# Patient Record
Sex: MALE | Race: WHITE | NOT HISPANIC OR LATINO | URBAN - METROPOLITAN AREA
[De-identification: names, ages, dates, MRNs, and addresses within clinical notes are randomized per-mention and may not be internally consistent; named-entity substitution may affect disease eponyms.]

---

## 2019-07-31 ENCOUNTER — EMERGENCY (EMERGENCY)
Facility: HOSPITAL | Age: 29
LOS: 0 days | Discharge: HOME | End: 2019-07-31
Admitting: EMERGENCY MEDICINE
Payer: MEDICAID

## 2019-07-31 VITALS
DIASTOLIC BLOOD PRESSURE: 92 MMHG | HEART RATE: 87 BPM | TEMPERATURE: 98 F | RESPIRATION RATE: 20 BRPM | SYSTOLIC BLOOD PRESSURE: 113 MMHG | OXYGEN SATURATION: 98 %
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
## 2019-07-31 DIAGNOSIS — M25.571 PAIN IN RIGHT ANKLE AND JOINTS OF RIGHT FOOT: ICD-10-CM
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
## 2019-07-31 PROCEDURE — 99283 EMERGENCY DEPT VISIT LOW MDM: CPT

## 2019-07-31 PROCEDURE — 73610 X-RAY EXAM OF ANKLE: CPT | Mod: 26,RT

## 2019-07-31 PROCEDURE — 73630 X-RAY EXAM OF FOOT: CPT | Mod: 26,RT

## 2019-07-31 NOTE — ED PROVIDER NOTE - PHYSICAL EXAMINATION
VITAL SIGNS: I have reviewed nursing notes and confirm.  CONSTITUTIONAL: Well-developed; well-nourished; in no acute distress.  SKIN: Skin exam is warm and dry, no acute rash.  HEAD: Normocephalic; atraumatic.  EYES: PERRL, EOM intact; conjunctiva and sclera clear.  ENT: No nasal discharge; airway clear.   NECK: Supple; non tender.  CARD: S1, S2 normal; no murmurs, gallops, or rubs. Regular rate and rhythm.  RESP: Clear to auscultation bilaterally. No wheezes, rales or rhonchi.  ABD: Normal bowel sounds; soft; non-distended; non-tender.   EXT: (+) Mild tenderness over right ankle and dorsal aspect of right foot. FROM. No swelling, no ecchymosis.  No edema.  LYMPH: No acute cervical adenopathy.  NEURO: Alert, oriented. Grossly unremarkable. No focal deficits.  PSYCH: Cooperative, appropriate.

## 2019-07-31 NOTE — ED PROVIDER NOTE - NS ED ROS FT
Constitutional: See HPI. No fever/chills.  Neck: No neck pain or stiffness.  Cardiac: No chest pain, SOB or edema. No chest pain with exertion.  Respiratory: No cough or respiratory distress. No hemoptysis.   GI: No nausea, vomiting, diarrhea or abdominal pain.  MS: (+) Right ankle pain. (+) right foot pain. No numbness, no tingling, no redness, No myalgia, muscle weakness, joint pain or back pain.  Neuro: No headache or weakness. No LOC.  Skin: No rash.  Except as documented in the HPI, all other systems are negative.

## 2019-07-31 NOTE — ED PROVIDER NOTE - NSFOLLOWUPINSTRUCTIONS_ED_ALL_ED_FT
Ankle Pain  Image   Many things can cause ankle pain, including an injury to the area and overuse of the ankle. The ankle joint holds your body weight and allows you to move around. Ankle pain can occur on either side or the back of one ankle or both ankles. Ankle pain may be sharp and burning or dull and aching. There may be tenderness, stiffness, redness, or warmth around the ankle.    Follow these instructions at home:  Activity     Rest your ankle as told by your health care provider. Avoid any activities that cause ankle pain.  Do exercises as told by your health care provider.  Ask your health care provider if you can drive.  Using a brace, a bandage, or crutches     If you were given a brace:  Wear it as told by your health care provider.  Remove it when you take a bath or a shower.  Try not to move your ankle very much, but wiggle your toes from time to time. This helps to prevent swelling.  If you were given an elastic bandage:  Remove it when you take a bath or a shower.  Try not to move your ankle very much, but wiggle your toes from time to time. This helps to prevent swelling.  Adjust the bandage to make it more comfortable if it feels too tight.  Loosen the bandage if you have numbness or tingling in your foot or if your foot turns cold and blue.  If you have crutches, use them as told by your health care provider. Continue to use them until you can walk without feeling pain in your ankle.  Managing pain, stiffness, and swelling     Raise (elevate) your ankle above the level of your heart while you are sitting or lying down.  If directed, apply ice to the area:  Put ice in a plastic bag.  Place a towel between your skin and the bag.  Leave the ice on for 20 minutes, 2–3 times per day.  General instructions     Keep all follow-up visits as told by your health care provider. This is important.  Record this information that may be helpful for you and your health care provider:  How often you have ankle pain.  Where the pain is located.  What the pain feels like.  Take over-the-counter and prescription medicines only as told by your health care provider.  Contact a health care provider if:  Your pain gets worse.  Your pain is not relieved with medicines.  You have a fever or chills.  You are having more trouble with walking.  You have new symptoms.  Get help right away if:  Your foot, leg, toes, or ankle tingles or becomes numb.  Your foot, leg, toes, or ankle becomes swollen.  Your foot, leg, toes, or ankle turns pale or blue.  This information is not intended to replace advice given to you by your health care provider. Make sure you discuss any questions you have with your health care provider. Ankle Pain    Many things can cause ankle pain, including an injury to the area and overuse of the ankle. The ankle joint holds your body weight and allows you to move around. Ankle pain can occur on either side or the back of one ankle or both ankles. Ankle pain may be sharp and burning or dull and aching. There may be tenderness, stiffness, redness, or warmth around the ankle.    Follow these instructions at home:  Activity     Rest your ankle as told by your health care provider. Avoid any activities that cause ankle pain.  Do exercises as told by your health care provider.  Ask your health care provider if you can drive.  Using a brace, a bandage, or crutches     If you were given a brace:  Wear it as told by your health care provider.  Remove it when you take a bath or a shower.  Try not to move your ankle very much, but wiggle your toes from time to time. This helps to prevent swelling.  If you were given an elastic bandage:  Remove it when you take a bath or a shower.  Try not to move your ankle very much, but wiggle your toes from time to time. This helps to prevent swelling.  Adjust the bandage to make it more comfortable if it feels too tight.  Loosen the bandage if you have numbness or tingling in your foot or if your foot turns cold and blue.  If you have crutches, use them as told by your health care provider. Continue to use them until you can walk without feeling pain in your ankle.  Managing pain, stiffness, and swelling     Raise (elevate) your ankle above the level of your heart while you are sitting or lying down.  If directed, apply ice to the area:  Put ice in a plastic bag.  Place a towel between your skin and the bag.  Leave the ice on for 20 minutes, 2–3 times per day.  General instructions     Keep all follow-up visits as told by your health care provider. This is important.  Record this information that may be helpful for you and your health care provider:  How often you have ankle pain.  Where the pain is located.  What the pain feels like.  Take over-the-counter and prescription medicines only as told by your health care provider.  Contact a health care provider if:  Your pain gets worse.  Your pain is not relieved with medicines.  You have a fever or chills.  You are having more trouble with walking.  You have new symptoms.  Get help right away if:  Your foot, leg, toes, or ankle tingles or becomes numb.  Your foot, leg, toes, or ankle becomes swollen.  Your foot, leg, toes, or ankle turns pale or blue.  This information is not intended to replace advice given to you by your health care provider. Make sure you discuss any questions you have with your health care provider.    Foot Pain  ImageMany things can cause foot pain. Some common causes are:  An injury.  A sprain.  Arthritis.  Blisters.  Bunions.  Follow these instructions at home:  Pay attention to any changes in your symptoms. Take these actions to help with your discomfort:  If directed, put ice on the affected area:  Put ice in a plastic bag.  Place a towel between your skin and the bag.  Leave the ice on for 15–20 minutes, 3?4 times a day for 2 days.  Take over-the-counter and prescription medicines only as told by your health care provider.  Wear comfortable, supportive shoes that fit you well. Do not wear high heels.  Do not stand or walk for long periods of time.  Do not lift a lot of weight. This can put added pressure on your feet.  Do stretches to relieve foot pain and stiffness as told by your health care provider.  Rub your foot gently.  Keep your feet clean and dry.  Contact a health care provider if:  Your pain does not get better after a few days of self-care.  Your pain gets worse.  You cannot stand on your foot.  Get help right away if:  Your foot is numb or tingling.  Your foot or toes are swollen.  Your foot or toes turn white or blue.  You have warmth and redness along your foot.  This information is not intended to replace advice given to you by your health care provider. Make sure you discuss any questions you have with your health care provider.

## 2019-07-31 NOTE — ED PROVIDER NOTE - CARE PROVIDER_API CALL
Raimundo Leal (MD)  Orthopaedic Surgery  [address] [place] [address]  Phone: [phone]  Fax: [phone]  Follow Up Time: 1-3 Days

## 2019-07-31 NOTE — ED PROVIDER NOTE - CARE PROVIDERS DIRECT ADDRESSES
[email or web address]

## 2019-07-31 NOTE — ED PROVIDER NOTE - OBJECTIVE STATEMENT
28 y/o F with no PMH presents with right ankle pain and foot pain. Pt was running and jumping over fences to leo someone who bit his dog. Pt states the pain is moderate and worse with movement. Pt states he took Ibuprofen PTA with relief. Pt was able to bear weight. No bruising, no swelling, no redness. No chills, no numbness or tingling. No bony deformities.

## 2019-07-31 NOTE — ED PROVIDER NOTE - CLINICAL SUMMARY MEDICAL DECISION MAKING FREE TEXT BOX
Pt with ankle and foot pain after strenuous activity. No falls. Xrays neg. Ace wrap and ortho fu given. I have discussed the discharge plan with the patient. The patient agrees with the plan, as discussed.  The patient understands Emergency Department diagnosis is a preliminary diagnosis often based on limited information and that the patient must adhere to the follow-up plan as discussed.  The patient understands that if the symptoms worsen or if prescribed medications do not have the desired/planned effect that the patient may return to the Emergency Department at any time for further evaluation and treatment.